# Patient Record
Sex: FEMALE | NOT HISPANIC OR LATINO | URBAN - METROPOLITAN AREA
[De-identification: names, ages, dates, MRNs, and addresses within clinical notes are randomized per-mention and may not be internally consistent; named-entity substitution may affect disease eponyms.]

---

## 2024-04-01 ENCOUNTER — OFFICE VISIT (OUTPATIENT)
Dept: URGENT CARE | Facility: CLINIC | Age: 34
End: 2024-04-01
Payer: COMMERCIAL

## 2024-04-01 VITALS
HEART RATE: 69 BPM | OXYGEN SATURATION: 98 % | TEMPERATURE: 98.2 F | WEIGHT: 160 LBS | BODY MASS INDEX: 23.7 KG/M2 | RESPIRATION RATE: 14 BRPM | HEIGHT: 69 IN | DIASTOLIC BLOOD PRESSURE: 57 MMHG | SYSTOLIC BLOOD PRESSURE: 126 MMHG

## 2024-04-01 DIAGNOSIS — R52 BODY ACHES: ICD-10-CM

## 2024-04-01 DIAGNOSIS — U07.1 COVID-19: Primary | ICD-10-CM

## 2024-04-01 LAB — POC SARS-COV-2 AG: ABNORMAL

## 2024-04-01 PROCEDURE — 87426 SARSCOV CORONAVIRUS AG IA: CPT

## 2024-04-01 PROCEDURE — 99203 OFFICE O/P NEW LOW 30 MIN: CPT

## 2024-04-01 RX ORDER — ACETAMINOPHEN 500 MG
1000 TABLET ORAL EVERY 6 HOURS PRN
COMMUNITY
Start: 2024-02-05

## 2024-04-01 ASSESSMENT — ENCOUNTER SYMPTOMS
DIARRHEA: 0
CHILLS: 1
COUGH: 0
SORE THROAT: 0
ABDOMINAL PAIN: 0
FEVER: 0
FATIGUE: 1
NAUSEA: 1
HEADACHES: 1
SHORTNESS OF BREATH: 0
MYALGIAS: 1
RHINORRHEA: 0
VOMITING: 0

## 2024-04-01 ASSESSMENT — PAIN SCALES - GENERAL: PAINLEVEL: 5

## 2024-04-01 NOTE — PROGRESS NOTES
Subjective   Patient ID: Dilia Foss is a 33 y.o. female.    HPI  Patient presents urgent care for COVID test.  Patient states that she tested positive at home and she needs to have documentation for work.  Patient states that she has had fatigue, body aches, chills, sweats, headache, nausea since yesterday.  Patient denies any vomiting, diarrhea, known fever at this time.  Patient denies any chest pain, difficulty breathing.  Patient states that she has taken Tylenol for the headache however she has not taken anything else over-the-counter.    Review of Systems   Constitutional:  Positive for chills and fatigue. Negative for fever.   HENT:  Negative for congestion, ear pain, rhinorrhea and sore throat.    Respiratory:  Negative for cough and shortness of breath.    Cardiovascular:  Negative for chest pain.   Gastrointestinal:  Positive for nausea. Negative for abdominal pain, diarrhea and vomiting.   Musculoskeletal:  Positive for myalgias.   Neurological:  Positive for headaches.     Objective   Physical Exam  Constitutional:       Appearance: Normal appearance.   HENT:      Head: Normocephalic and atraumatic.      Right Ear: Tympanic membrane, ear canal and external ear normal.      Left Ear: Tympanic membrane, ear canal and external ear normal.      Nose: Nose normal.      Mouth/Throat:      Mouth: Mucous membranes are moist.      Pharynx: Oropharynx is clear.   Eyes:      Extraocular Movements: Extraocular movements intact.      Conjunctiva/sclera: Conjunctivae normal.      Pupils: Pupils are equal, round, and reactive to light.   Cardiovascular:      Rate and Rhythm: Normal rate and regular rhythm.      Pulses: Normal pulses.      Heart sounds: Normal heart sounds.   Pulmonary:      Effort: Pulmonary effort is normal.      Breath sounds: Normal breath sounds.   Abdominal:      General: Abdomen is flat. Bowel sounds are normal.      Palpations: Abdomen is soft.   Musculoskeletal:      Cervical back: Normal  range of motion and neck supple.   Skin:     General: Skin is warm and dry.      Capillary Refill: Capillary refill takes less than 2 seconds.   Neurological:      General: No focal deficit present.      Mental Status: She is alert and oriented to person, place, and time.         Discussed with patient that rapid COVID was positive.  Provided work note as well as documentation for patient.  Discussed with patient that there is no longer a 5-day quarantine guideline in place and that she may return to work when she is been fever free for 24 hours.    Assessment/Plan   Problem List Items Addressed This Visit             ICD-10-CM    COVID-19 - Primary U07.1     Other Visit Diagnoses         Codes    Body aches     R52    Relevant Orders    POCT BD Veritor COVID-19 AG (Completed)            Patient disposition: Home

## 2024-04-01 NOTE — LETTER
April 1, 2024     Patient: Dilia Foss   YOB: 1990   Date of Visit: 4/1/2024       To Whom It May Concern:    It is my medical opinion that Dilia Foss may return to work on 4/3/24 .    If you have any questions or concerns, please don't hesitate to call.         Sincerely,        Alexa Trevino, JERRY-CNP    CC: No Recipients